# Patient Record
Sex: MALE | Race: WHITE | NOT HISPANIC OR LATINO | URBAN - METROPOLITAN AREA
[De-identification: names, ages, dates, MRNs, and addresses within clinical notes are randomized per-mention and may not be internally consistent; named-entity substitution may affect disease eponyms.]

---

## 2017-11-10 ENCOUNTER — EMERGENCY (EMERGENCY)
Facility: HOSPITAL | Age: 32
LOS: 0 days | Discharge: HOME | End: 2017-11-10

## 2017-11-10 DIAGNOSIS — F11.20 OPIOID DEPENDENCE, UNCOMPLICATED: ICD-10-CM

## 2017-11-10 DIAGNOSIS — F41.9 ANXIETY DISORDER, UNSPECIFIED: ICD-10-CM

## 2017-11-10 DIAGNOSIS — Z88.2 ALLERGY STATUS TO SULFONAMIDES: ICD-10-CM

## 2017-11-10 DIAGNOSIS — J45.909 UNSPECIFIED ASTHMA, UNCOMPLICATED: ICD-10-CM

## 2017-11-10 DIAGNOSIS — Z88.1 ALLERGY STATUS TO OTHER ANTIBIOTIC AGENTS STATUS: ICD-10-CM

## 2017-11-10 DIAGNOSIS — F17.200 NICOTINE DEPENDENCE, UNSPECIFIED, UNCOMPLICATED: ICD-10-CM

## 2017-11-10 DIAGNOSIS — F11.10 OPIOID ABUSE, UNCOMPLICATED: ICD-10-CM

## 2017-11-11 ENCOUNTER — INPATIENT (INPATIENT)
Facility: HOSPITAL | Age: 32
LOS: 4 days | Discharge: HOME | End: 2017-11-16
Attending: INTERNAL MEDICINE

## 2017-11-11 ENCOUNTER — EMERGENCY (EMERGENCY)
Facility: HOSPITAL | Age: 32
LOS: 0 days | Discharge: HOME | End: 2017-11-11

## 2017-11-11 DIAGNOSIS — F41.9 ANXIETY DISORDER, UNSPECIFIED: ICD-10-CM

## 2017-11-11 DIAGNOSIS — J45.909 UNSPECIFIED ASTHMA, UNCOMPLICATED: ICD-10-CM

## 2017-11-11 DIAGNOSIS — F17.200 NICOTINE DEPENDENCE, UNSPECIFIED, UNCOMPLICATED: ICD-10-CM

## 2017-11-11 DIAGNOSIS — F11.20 OPIOID DEPENDENCE, UNCOMPLICATED: ICD-10-CM

## 2017-11-11 DIAGNOSIS — Z02.9 ENCOUNTER FOR ADMINISTRATIVE EXAMINATIONS, UNSPECIFIED: ICD-10-CM

## 2017-11-20 DIAGNOSIS — F10.10 ALCOHOL ABUSE, UNCOMPLICATED: ICD-10-CM

## 2017-11-20 DIAGNOSIS — F11.20 OPIOID DEPENDENCE, UNCOMPLICATED: ICD-10-CM

## 2017-11-20 DIAGNOSIS — F17.210 NICOTINE DEPENDENCE, CIGARETTES, UNCOMPLICATED: ICD-10-CM

## 2018-05-26 ENCOUNTER — INPATIENT (INPATIENT)
Facility: HOSPITAL | Age: 33
LOS: 4 days | Discharge: HOME | End: 2018-05-31
Attending: INTERNAL MEDICINE | Admitting: INTERNAL MEDICINE

## 2018-05-26 VITALS
WEIGHT: 265 LBS | HEIGHT: 72 IN | TEMPERATURE: 99 F | HEART RATE: 79 BPM | DIASTOLIC BLOOD PRESSURE: 67 MMHG | SYSTOLIC BLOOD PRESSURE: 167 MMHG | RESPIRATION RATE: 18 BRPM | OXYGEN SATURATION: 94 %

## 2018-05-26 DIAGNOSIS — F10.10 ALCOHOL ABUSE, UNCOMPLICATED: ICD-10-CM

## 2018-05-26 DIAGNOSIS — F32.9 MAJOR DEPRESSIVE DISORDER, SINGLE EPISODE, UNSPECIFIED: ICD-10-CM

## 2018-05-26 DIAGNOSIS — F11.10 OPIOID ABUSE, UNCOMPLICATED: ICD-10-CM

## 2018-05-26 DIAGNOSIS — K66.8 OTHER SPECIFIED DISORDERS OF PERITONEUM: Chronic | ICD-10-CM

## 2018-05-26 LAB
ALBUMIN SERPL ELPH-MCNC: 4 G/DL — SIGNIFICANT CHANGE UP (ref 3.5–5.2)
ALP SERPL-CCNC: 52 U/L — SIGNIFICANT CHANGE UP (ref 30–115)
ALT FLD-CCNC: 31 U/L — SIGNIFICANT CHANGE UP (ref 0–41)
AMMONIA BLD-MCNC: 49 UMOL/L — SIGNIFICANT CHANGE UP (ref 11–55)
ANION GAP SERPL CALC-SCNC: 10 MMOL/L — SIGNIFICANT CHANGE UP (ref 7–14)
APAP SERPL-MCNC: <5 UG/ML — LOW (ref 10–30)
APPEARANCE UR: CLEAR — SIGNIFICANT CHANGE UP
APTT BLD: 29.9 SEC — SIGNIFICANT CHANGE UP (ref 27–39.2)
AST SERPL-CCNC: 21 U/L — SIGNIFICANT CHANGE UP (ref 0–41)
BASOPHILS # BLD AUTO: 0.03 K/UL — SIGNIFICANT CHANGE UP (ref 0–0.2)
BASOPHILS NFR BLD AUTO: 0.4 % — SIGNIFICANT CHANGE UP (ref 0–1)
BILIRUB SERPL-MCNC: 0.3 MG/DL — SIGNIFICANT CHANGE UP (ref 0.2–1.2)
BILIRUB UR-MCNC: NEGATIVE — SIGNIFICANT CHANGE UP
BUN SERPL-MCNC: 10 MG/DL — SIGNIFICANT CHANGE UP (ref 10–20)
CALCIUM SERPL-MCNC: 9.5 MG/DL — SIGNIFICANT CHANGE UP (ref 8.5–10.1)
CHLORIDE SERPL-SCNC: 103 MMOL/L — SIGNIFICANT CHANGE UP (ref 98–110)
CO2 SERPL-SCNC: 28 MMOL/L — SIGNIFICANT CHANGE UP (ref 17–32)
COLOR SPEC: YELLOW — SIGNIFICANT CHANGE UP
CREAT SERPL-MCNC: 1 MG/DL — SIGNIFICANT CHANGE UP (ref 0.7–1.5)
DIFF PNL FLD: NEGATIVE — SIGNIFICANT CHANGE UP
DRUG SCREEN 1, URINE RESULT: SIGNIFICANT CHANGE UP
EOSINOPHIL # BLD AUTO: 0.22 K/UL — SIGNIFICANT CHANGE UP (ref 0–0.7)
EOSINOPHIL NFR BLD AUTO: 2.9 % — SIGNIFICANT CHANGE UP (ref 0–8)
ETHANOL SERPL-MCNC: <10 MG/DL — HIGH
GLUCOSE SERPL-MCNC: 90 MG/DL — SIGNIFICANT CHANGE UP (ref 70–99)
GLUCOSE UR QL: NEGATIVE MG/DL — SIGNIFICANT CHANGE UP
HCT VFR BLD CALC: 41.9 % — LOW (ref 42–52)
HGB BLD-MCNC: 14.1 G/DL — SIGNIFICANT CHANGE UP (ref 14–18)
IMM GRANULOCYTES NFR BLD AUTO: 0.1 % — SIGNIFICANT CHANGE UP (ref 0.1–0.3)
INR BLD: 1.11 RATIO — SIGNIFICANT CHANGE UP (ref 0.65–1.3)
KETONES UR-MCNC: NEGATIVE — SIGNIFICANT CHANGE UP
LEUKOCYTE ESTERASE UR-ACNC: NEGATIVE — SIGNIFICANT CHANGE UP
LIDOCAIN IGE QN: 20 U/L — SIGNIFICANT CHANGE UP (ref 7–60)
LYMPHOCYTES # BLD AUTO: 3.47 K/UL — HIGH (ref 1.2–3.4)
LYMPHOCYTES # BLD AUTO: 45.9 % — SIGNIFICANT CHANGE UP (ref 20.5–51.1)
MAGNESIUM SERPL-MCNC: 1.9 MG/DL — SIGNIFICANT CHANGE UP (ref 1.8–2.4)
MCHC RBC-ENTMCNC: 28.4 PG — SIGNIFICANT CHANGE UP (ref 27–31)
MCHC RBC-ENTMCNC: 33.7 G/DL — SIGNIFICANT CHANGE UP (ref 32–37)
MCV RBC AUTO: 84.3 FL — SIGNIFICANT CHANGE UP (ref 80–94)
MONOCYTES # BLD AUTO: 0.51 K/UL — SIGNIFICANT CHANGE UP (ref 0.1–0.6)
MONOCYTES NFR BLD AUTO: 6.7 % — SIGNIFICANT CHANGE UP (ref 1.7–9.3)
NEUTROPHILS # BLD AUTO: 3.32 K/UL — SIGNIFICANT CHANGE UP (ref 1.4–6.5)
NEUTROPHILS NFR BLD AUTO: 44 % — SIGNIFICANT CHANGE UP (ref 42.2–75.2)
NITRITE UR-MCNC: NEGATIVE — SIGNIFICANT CHANGE UP
NRBC # BLD: 0 /100 WBCS — SIGNIFICANT CHANGE UP (ref 0–0)
PH UR: 5.5 — SIGNIFICANT CHANGE UP (ref 5–8)
PLATELET # BLD AUTO: 236 K/UL — SIGNIFICANT CHANGE UP (ref 130–400)
POTASSIUM SERPL-MCNC: 3.8 MMOL/L — SIGNIFICANT CHANGE UP (ref 3.5–5)
POTASSIUM SERPL-SCNC: 3.8 MMOL/L — SIGNIFICANT CHANGE UP (ref 3.5–5)
PROT SERPL-MCNC: 6.3 G/DL — SIGNIFICANT CHANGE UP (ref 6–8)
PROT UR-MCNC: NEGATIVE MG/DL — SIGNIFICANT CHANGE UP
PROTHROM AB SERPL-ACNC: 12 SEC — SIGNIFICANT CHANGE UP (ref 9.95–12.87)
RBC # BLD: 4.97 M/UL — SIGNIFICANT CHANGE UP (ref 4.7–6.1)
RBC # FLD: 13.4 % — SIGNIFICANT CHANGE UP (ref 11.5–14.5)
SALICYLATES SERPL-MCNC: <0.3 MG/DL — LOW (ref 4–30)
SODIUM SERPL-SCNC: 141 MMOL/L — SIGNIFICANT CHANGE UP (ref 135–146)
SP GR SPEC: >=1.03 (ref 1.01–1.03)
UROBILINOGEN FLD QL: 0.2 MG/DL — SIGNIFICANT CHANGE UP (ref 0.2–0.2)
WBC # BLD: 7.56 K/UL — SIGNIFICANT CHANGE UP (ref 4.8–10.8)
WBC # FLD AUTO: 7.56 K/UL — SIGNIFICANT CHANGE UP (ref 4.8–10.8)

## 2018-05-26 RX ORDER — IBUPROFEN 200 MG
400 TABLET ORAL EVERY 12 HOURS
Qty: 0 | Refills: 0 | Status: DISCONTINUED | OUTPATIENT
Start: 2018-05-26 | End: 2018-05-31

## 2018-05-26 RX ORDER — THIAMINE MONONITRATE (VIT B1) 100 MG
100 TABLET ORAL DAILY
Qty: 0 | Refills: 0 | Status: DISCONTINUED | OUTPATIENT
Start: 2018-05-26 | End: 2018-05-31

## 2018-05-26 RX ORDER — METHADONE HYDROCHLORIDE 40 MG/1
5 TABLET ORAL EVERY 12 HOURS
Qty: 0 | Refills: 0 | Status: DISCONTINUED | OUTPATIENT
Start: 2018-05-29 | End: 2018-05-31

## 2018-05-26 RX ORDER — METHADONE HYDROCHLORIDE 40 MG/1
15 TABLET ORAL ONCE
Qty: 0 | Refills: 0 | Status: DISCONTINUED | OUTPATIENT
Start: 2018-05-26 | End: 2018-05-26

## 2018-05-26 RX ORDER — HYDROXYZINE HCL 10 MG
50 TABLET ORAL EVERY 6 HOURS
Qty: 0 | Refills: 0 | Status: DISCONTINUED | OUTPATIENT
Start: 2018-05-26 | End: 2018-05-31

## 2018-05-26 RX ORDER — TRAZODONE HCL 50 MG
100 TABLET ORAL AT BEDTIME
Qty: 0 | Refills: 0 | Status: DISCONTINUED | OUTPATIENT
Start: 2018-05-26 | End: 2018-05-31

## 2018-05-26 RX ORDER — METHADONE HYDROCHLORIDE 40 MG/1
10 TABLET ORAL EVERY 12 HOURS
Qty: 0 | Refills: 0 | Status: DISCONTINUED | OUTPATIENT
Start: 2018-05-28 | End: 2018-05-29

## 2018-05-26 RX ORDER — TUBERCULIN PURIFIED PROTEIN DERIVATIVE 5 [IU]/.1ML
5 INJECTION, SOLUTION INTRADERMAL ONCE
Qty: 0 | Refills: 0 | Status: COMPLETED | OUTPATIENT
Start: 2018-05-26 | End: 2018-05-27

## 2018-05-26 RX ORDER — METHADONE HYDROCHLORIDE 40 MG/1
TABLET ORAL
Qty: 0 | Refills: 0 | Status: COMPLETED | OUTPATIENT
Start: 2018-05-27 | End: 2018-05-31

## 2018-05-26 RX ORDER — METHADONE HYDROCHLORIDE 40 MG/1
15 TABLET ORAL EVERY 12 HOURS
Qty: 0 | Refills: 0 | Status: DISCONTINUED | OUTPATIENT
Start: 2018-05-27 | End: 2018-05-27

## 2018-05-26 RX ORDER — ACETAMINOPHEN 500 MG
650 TABLET ORAL EVERY 8 HOURS
Qty: 0 | Refills: 0 | Status: DISCONTINUED | OUTPATIENT
Start: 2018-05-26 | End: 2018-05-31

## 2018-05-26 RX ADMIN — Medication 50 MILLIGRAM(S): at 18:32

## 2018-05-26 RX ADMIN — METHADONE HYDROCHLORIDE 15 MILLIGRAM(S): 40 TABLET ORAL at 21:19

## 2018-05-26 RX ADMIN — Medication 100 MILLIGRAM(S): at 21:20

## 2018-05-26 NOTE — H&P ADULT - NSHPPHYSICALEXAM_GEN_ALL_CORE
PHYSICAL EXAM:    Vital Signs Last 24 Hrs    T(F): 96.4 (05-26-18 @ 16:56), Max: 98.7 (05-26-18 @ 13:41)  HR: 58 (05-26-18 @ 16:56) (58 - 79)  BP: 120/61 (05-26-18 @ 16:56)  RR: 16 (05-26-18 @ 16:56) (16 - 19)  SpO2: 97% (05-26-18 @ 14:52) (94% - 97%)    Constitutional: NAD, A&O x3    Eyes: PERRLA    Respiratory: +air entry, no rales, no rhonchi, no wheezes    Cardiovascular: +S1 and S2, regular rate and rhythm    Gastrointestinal: +BS, soft, non-tender, not distended    Extremities:  + Hand Tremors, no edema, no calf tenderness    Vascular: +dorsal pedis and radial pulses, no extremity cyanosis    Neurological: sensation intact, ROM equal B/L, CN II-XII intact    Skin: Multiple tattoos, no rashes, normal turgor

## 2018-05-26 NOTE — H&P ADULT - ATTENDING COMMENTS
Patient interviewed and examined.    Chart reviewed.    PA's H&P noted and modified, as appropriate.    Case discussed on team rounds    Following is my summary of the case.    Admitted for detox: from _x___ED, ___Intake, ____Med/Surg Floor    Alcohol__x__   Opioid__x___  Benzo___ Other_____    Substance amount, duration of use, last usage, and prior attempts at detox or rehabs, are outlined above in the H&P and discussed with patient.    Associated withdrawal symptoms presents.  Comorbid conditions noted. Chronic and Stable.    Past Medical Hx, Psych Hx, family Hx, Social Hx from H&P reviewed and NO changes.    Old medical record and medication Hx. Reviewed    Following items reviewed and addressed:  1. labs  2. EKG  3. Imaging from PACs module    Examination: no change from PA's exam.    Place on following protocol  _____Medically Managed  __X__Medically Supervised    Ciwa__x___Librium taper____Ativan taper___Methadone taper_x__ Phenobarb taper____ Suboxone Induction____MMTP____    Narcan Kit Offered    Psych Consult __X__N/A  ___Ordered    Physical Therapy  ___X N/A   ___  Ordered    Aftercare disposition to be addressed by counselors.    Estimated length of stay 3-5 days.

## 2018-05-26 NOTE — H&P ADULT - PROBLEM SELECTOR PLAN 3
presently on no meds, stopped them himself a while ago. I offered Psych evaluation to restart but he refused.

## 2018-05-26 NOTE — H&P ADULT - ASSESSMENT
31 y/o male Alcohol and Heroin Dependence, also with extensive Psych Hx/non-complient with medications.

## 2018-05-26 NOTE — H&P ADULT - HISTORY OF PRESENT ILLNESS
31 y/o male with Alcohol and Opiate Dependence.  -Heroin/Opiate X 12 yrs, Heroin used daily, nasal route, 3 Bundles /day, last used this AM: 1 Bundle  -Alcohol X 3 yrs, drinks daily, AVG intake: 1 pt liquor, last drink 2 days ago.  + Tremors, + Blackouts, No Withdrawl Seizures.    2-3 Attempts at Detox, last attempt 11/2017

## 2018-05-26 NOTE — ED PROVIDER NOTE - ATTENDING CONTRIBUTION TO CARE
33 yo M presents requesting detox from alcohol.  Also uses heroin.  no SI, no CP, no SOB.  On exam pt in NAD AAO x 3, no signs of trauma, steady gait, Lungs CTA  B/L, no wrr, OP clear, PERRL, no tongue fasciculations, no tremor, abd nt nd, soft

## 2018-05-26 NOTE — H&P ADULT - NSHPLABSRESULTS_GEN_ALL_CORE
14.1   7.56  )-----------( 236      ( 26 May 2018 14:41 )             41.9       05-26    141  |  103  |  10  ----------------------------<  90  3.8   |  28  |  1.0    Ca    9.5      26 May 2018 14:41  Mg     1.9     05-26    TPro  6.3  /  Alb  4.0  /  TBili  0.3  /  DBili  x   /  AST  21  /  ALT  31  /  AlkPhos  52  05-26        PT/INR - ( 26 May 2018 14:41 )   PT: 12.00 sec;   INR: 1.11 ratio         PTT - ( 26 May 2018 14:41 )  PTT:29.9 sec

## 2018-05-26 NOTE — H&P ADULT - PMH
Anxiety    Asthma affecting pregnancy, antepartum    Bipolar 1 disorder    Depression    ETOH abuse    Heroin abuse    Substance abuse

## 2018-05-27 LAB
HAV IGM SER-ACNC: SIGNIFICANT CHANGE UP
HBV CORE IGM SER-ACNC: SIGNIFICANT CHANGE UP
HBV SURFACE AG SER-ACNC: SIGNIFICANT CHANGE UP
HCV AB S/CO SERPL IA: 0.05 S/CO — SIGNIFICANT CHANGE UP
HCV AB SERPL-IMP: SIGNIFICANT CHANGE UP

## 2018-05-27 RX ADMIN — Medication 50 MILLIGRAM(S): at 08:58

## 2018-05-27 RX ADMIN — METHADONE HYDROCHLORIDE 15 MILLIGRAM(S): 40 TABLET ORAL at 08:58

## 2018-05-27 RX ADMIN — METHADONE HYDROCHLORIDE 15 MILLIGRAM(S): 40 TABLET ORAL at 21:31

## 2018-05-27 RX ADMIN — TUBERCULIN PURIFIED PROTEIN DERIVATIVE 5 UNIT(S): 5 INJECTION, SOLUTION INTRADERMAL at 18:55

## 2018-05-27 RX ADMIN — Medication 50 MILLIGRAM(S): at 18:43

## 2018-05-27 RX ADMIN — Medication 100 MILLIGRAM(S): at 08:58

## 2018-05-27 RX ADMIN — Medication 100 MILLIGRAM(S): at 21:41

## 2018-05-27 RX ADMIN — Medication 1 TABLET(S): at 08:58

## 2018-05-27 NOTE — CHART NOTE - NSCHARTNOTEFT_GEN_A_CORE
PPD PLACED ON LFA, TO BE READ IN 48-72 HRS PPD PLACED ON LFA, TO BE READ IN 48-72 HRS    PPD read 5/29/18   0mm induration...................... Thompson RRT

## 2018-05-28 RX ADMIN — Medication 100 MILLIGRAM(S): at 09:10

## 2018-05-28 RX ADMIN — METHADONE HYDROCHLORIDE 10 MILLIGRAM(S): 40 TABLET ORAL at 09:11

## 2018-05-28 RX ADMIN — Medication 50 MILLIGRAM(S): at 09:10

## 2018-05-28 RX ADMIN — Medication 100 MILLIGRAM(S): at 21:22

## 2018-05-28 RX ADMIN — Medication 1 TABLET(S): at 09:10

## 2018-05-28 RX ADMIN — METHADONE HYDROCHLORIDE 10 MILLIGRAM(S): 40 TABLET ORAL at 21:19

## 2018-05-28 NOTE — CHART NOTE - NSCHARTNOTEFT_GEN_A_CORE
Subsequent Inpatient Encounter                                       Detox Unit    SUZAN RIVERA   32y   Male      Chief Complaint:    Follow up for polysubstance abuse    HPI:     I reviewed previous notes. No Change, except if noted below.             Detail:_    ROS:   I reviewed with patient.  No changes from previous notes except if noted below.             Detail: _    PFSH I reviewed with patient. No changes from previous notes except if noted below.             Detail_    Medication reconciliation performed.    MEDICATIONS  (STANDING):  methadone    Tablet 10 milliGRAM(s) Oral every 12 hours  multivitamin 1 Tablet(s) Oral daily  thiamine 100 milliGRAM(s) Oral daily      MEDICATIONS  (PRN):  acetaminophen   Tablet. 650 milliGRAM(s) Oral every 8 hours PRN Mild Pain (1 - 3)  chlordiazePOXIDE 50 milliGRAM(s) Oral every 1 hour PRN Alcohol Withdrawal Symptoms  chlordiazePOXIDE 25 milliGRAM(s) Oral every 2 hours PRN Alcohol Withdrawal Symptoms  hydrOXYzine hydrochloride 50 milliGRAM(s) Oral every 6 hours PRN Anxiety  ibuprofen  Tablet 400 milliGRAM(s) Oral every 12 hours PRN Mild pain  traZODone 100 milliGRAM(s) Oral at bedtime PRN INSOMNIA      T(C): 36.4 (05-28-18 @ 06:33), Max: 36.5 (05-27-18 @ 18:00)  HR: 67 (05-28-18 @ 06:33) (47 - 71)  BP: 104/63 (05-28-18 @ 06:33) (95/48 - 130/56)  RR: 18 (05-28-18 @ 06:33) (14 - 18)  SpO2: --    PHYSICAL EXAM:      Constitutional: NAD, A&O x3    Eyes: PERRLA, no conjuctivitis    Neck: no lymphadenopathy    Respiratory: +air entry, no rales, no rhonchi, no wheezes    Cardiovascular: +S1 and S2, regular rate and rhythm    Gastrointestinal: +BS, soft, non-tender, not distended    Extremities:  no edema, no calf tenderness    Skin: no rashes, normal turgor                            14.1   7.56  )-----------( 236      ( 26 May 2018 14:41 )             41.9   05-26    141  |  103  |  10  ----------------------------<  90  3.8   |  28  |  1.0    Ca    9.5      26 May 2018 14:41  Mg     1.9     05-26    TPro  6.3  /  Alb  4.0  /  TBili  0.3  /  DBili  x   /  AST  21  /  ALT  31  /  AlkPhos  52  05-26  PT/INR - ( 26 May 2018 14:41 )   PT: 12.00 sec;   INR: 1.11 ratio         PTT - ( 26 May 2018 14:41 )  PTT:29.9 sec  Magnesium, Serum: 1.9 mg/dL (05-26-18 @ 14:41)  Ammonia, Serum: 49 umol/L (05-26-18 @ 14:41)  Hepatitis B Surface Antigen: Nonreact (05-26-18 @ 14:41)  Hepatitis C Virus S/CO Ratio: 0.05 S/CO (05-26-18 @ 14:41)    Hepatitis C Virus Interpretation: Nonreact (05-26-18 @ 14:41)      Urinalysis Basic - ( 26 May 2018 18:05 )    Color: Yellow / Appearance: Clear / SG: >=1.030 / pH: x  Gluc: x / Ketone: Negative  / Bili: Negative / Urobili: 0.2 mg/dL   Blood: x / Protein: Negative mg/dL / Nitrite: Negative   Leuk Esterase: Negative / RBC: x / WBC x   Sq Epi: x / Non Sq Epi: x / Bacteria: x    Drug Screen 1, Urine Result: Done (05-26-18 @ 18:05)        Impression and Plan:    Primary Diagnosis:  Alcohol Dependency                                Medication: Librium Protocol/ CIWA Protocol    Secondary Diagnosis:   opiate dependence                             Medication: methadone taper    Tertiary Diagnosis:                                                                       Medication      Continue Detox Protocols. Use of PRNS as needed for withdrawal and comfort.    Adjustments to protocols:    Labs/ Tests reviewed.    Tests ordered:     Likely Disposition: ___Home       ___Rehab       ___Outpatient Program    ___Self Help     _____Other    Estimated Length of stay:____

## 2018-05-29 RX ADMIN — Medication 0.1 MILLIGRAM(S): at 12:49

## 2018-05-29 RX ADMIN — Medication 100 MILLIGRAM(S): at 09:15

## 2018-05-29 RX ADMIN — Medication 50 MILLIGRAM(S): at 09:15

## 2018-05-29 RX ADMIN — TUBERCULIN PURIFIED PROTEIN DERIVATIVE 5 UNIT(S): 5 INJECTION, SOLUTION INTRADERMAL at 14:33

## 2018-05-29 RX ADMIN — METHADONE HYDROCHLORIDE 10 MILLIGRAM(S): 40 TABLET ORAL at 09:15

## 2018-05-29 RX ADMIN — Medication 100 MILLIGRAM(S): at 22:47

## 2018-05-29 RX ADMIN — Medication 1 TABLET(S): at 09:15

## 2018-05-29 RX ADMIN — Medication 50 MILLIGRAM(S): at 22:48

## 2018-05-29 RX ADMIN — METHADONE HYDROCHLORIDE 5 MILLIGRAM(S): 40 TABLET ORAL at 21:09

## 2018-05-30 RX ORDER — LAMOTRIGINE 25 MG/1
0 TABLET, ORALLY DISINTEGRATING ORAL
Qty: 0 | Refills: 0 | COMMUNITY

## 2018-05-30 RX ADMIN — Medication 100 MILLIGRAM(S): at 09:12

## 2018-05-30 RX ADMIN — Medication 50 MILLIGRAM(S): at 21:02

## 2018-05-30 RX ADMIN — Medication 100 MILLIGRAM(S): at 21:04

## 2018-05-30 RX ADMIN — Medication 50 MILLIGRAM(S): at 09:16

## 2018-05-30 RX ADMIN — Medication 0.1 MILLIGRAM(S): at 11:53

## 2018-05-30 RX ADMIN — METHADONE HYDROCHLORIDE 5 MILLIGRAM(S): 40 TABLET ORAL at 21:02

## 2018-05-30 RX ADMIN — Medication 1 TABLET(S): at 09:12

## 2018-05-30 RX ADMIN — METHADONE HYDROCHLORIDE 5 MILLIGRAM(S): 40 TABLET ORAL at 09:16

## 2018-05-30 NOTE — CHART NOTE - NSCHARTNOTEFT_GEN_A_CORE
Subsequent Inpatient Encounter                                       Detox Unit    SUZAN RIVERA   32y   Male      Chief Complaint:    Follow up for polysubstance abuse    HPI:     I reviewed previous notes. No Change, except if noted below.             Detail:_    ROS:   I reviewed with patient.  No changes from previous notes except if noted below.             Detail: _    PFSH I reviewed with patient. No changes from previous notes except if noted below.             Detail_    Medication reconciliation performed.    MEDICATIONS  (STANDING):  methadone    Tablet 10 milliGRAM(s) Oral every 12 hours  multivitamin 1 Tablet(s) Oral daily  thiamine 100 milliGRAM(s) Oral daily      MEDICATIONS  (PRN):  acetaminophen   Tablet. 650 milliGRAM(s) Oral every 8 hours PRN Mild Pain (1 - 3)  chlordiazePOXIDE 50 milliGRAM(s) Oral every 1 hour PRN Alcohol Withdrawal Symptoms  chlordiazePOXIDE 25 milliGRAM(s) Oral every 2 hours PRN Alcohol Withdrawal Symptoms  hydrOXYzine hydrochloride 50 milliGRAM(s) Oral every 6 hours PRN Anxiety  ibuprofen  Tablet 400 milliGRAM(s) Oral every 12 hours PRN Mild pain  traZODone 100 milliGRAM(s) Oral at bedtime PRN INSOMNIA      T(C): 36.4 (05-28-18 @ 06:33), Max: 36.5 (05-27-18 @ 18:00)  HR: 67 (05-28-18 @ 06:33) (47 - 71)  BP: 104/63 (05-28-18 @ 06:33) (95/48 - 130/56)  RR: 18 (05-28-18 @ 06:33) (14 - 18)  SpO2: --    PHYSICAL EXAM:      Constitutional: NAD, A&O x3    Eyes: PERRLA, no conjuctivitis    Neck: no lymphadenopathy    Respiratory: +air entry, no rales, no rhonchi, no wheezes    Cardiovascular: +S1 and S2, regular rate and rhythm    Gastrointestinal: +BS, soft, non-tender, not distended    Extremities:  no edema, no calf tenderness    Skin: no rashes, normal turgor                            14.1   7.56  )-----------( 236      ( 26 May 2018 14:41 )             41.9   05-26    141  |  103  |  10  ----------------------------<  90  3.8   |  28  |  1.0    Ca    9.5      26 May 2018 14:41  Mg     1.9     05-26    TPro  6.3  /  Alb  4.0  /  TBili  0.3  /  DBili  x   /  AST  21  /  ALT  31  /  AlkPhos  52  05-26  PT/INR - ( 26 May 2018 14:41 )   PT: 12.00 sec;   INR: 1.11 ratio         PTT - ( 26 May 2018 14:41 )  PTT:29.9 sec  Magnesium, Serum: 1.9 mg/dL (05-26-18 @ 14:41)  Ammonia, Serum: 49 umol/L (05-26-18 @ 14:41)  Hepatitis B Surface Antigen: Nonreact (05-26-18 @ 14:41)  Hepatitis C Virus S/CO Ratio: 0.05 S/CO (05-26-18 @ 14:41)    Hepatitis C Virus Interpretation: Nonreact (05-26-18 @ 14:41)      Urinalysis Basic - ( 26 May 2018 18:05 )    Color: Yellow / Appearance: Clear / SG: >=1.030 / pH: x  Gluc: x / Ketone: Negative  / Bili: Negative / Urobili: 0.2 mg/dL   Blood: x / Protein: Negative mg/dL / Nitrite: Negative   Leuk Esterase: Negative / RBC: x / WBC x   Sq Epi: x / Non Sq Epi: x / Bacteria: x    Drug Screen 1, Urine Result: Done (05-26-18 @ 18:05)        Impression and Plan:    Primary Diagnosis:  Alcohol Dependency                                Medication: Librium CIWA Protocol    Secondary Diagnosis:   opiate dependence                             Medication: methadone taper    Tertiary Diagnosis:                                                                       Medication      Continue Detox Protocols. Use of PRNS as needed for withdrawal and comfort.    Adjustments to protocols:    Labs/ Tests reviewed.    Tests ordered:     Likely Disposition: _x__Home       ___Rehab       ___Outpatient Program    ___Self Help     _____Other    Estimated Length of stay:__5__

## 2018-05-30 NOTE — CHART NOTE - NSCHARTNOTEFT_GEN_A_CORE
Allergies:  Sulfacetamide Sodium (Hives)      Diet: Regular    Activity: as tolerated    Follow up with    1. PMD in 2 weeks    2. Psych in 2 weeks    3.    Follow up for abnormal labs/tests    1.    Extra Instructions:      Flu Vaccine given  Yes_____         No______      Diagnosis:  Chemical Dependency   Maintain sobriety  refrain from all use      Patient Signature___________________________________________  Date_________________      Nurse Signature_____________________________________________Date_________________

## 2018-05-31 VITALS
HEART RATE: 53 BPM | TEMPERATURE: 97 F | RESPIRATION RATE: 16 BRPM | DIASTOLIC BLOOD PRESSURE: 54 MMHG | SYSTOLIC BLOOD PRESSURE: 115 MMHG

## 2018-05-31 RX ADMIN — METHADONE HYDROCHLORIDE 5 MILLIGRAM(S): 40 TABLET ORAL at 09:26

## 2018-05-31 RX ADMIN — Medication 100 MILLIGRAM(S): at 09:26

## 2018-05-31 RX ADMIN — Medication 1 TABLET(S): at 09:26

## 2018-06-04 DIAGNOSIS — F11.20 OPIOID DEPENDENCE, UNCOMPLICATED: ICD-10-CM

## 2018-06-04 DIAGNOSIS — F10.20 ALCOHOL DEPENDENCE, UNCOMPLICATED: ICD-10-CM

## 2018-06-04 DIAGNOSIS — F32.9 MAJOR DEPRESSIVE DISORDER, SINGLE EPISODE, UNSPECIFIED: ICD-10-CM

## 2018-06-04 DIAGNOSIS — F17.210 NICOTINE DEPENDENCE, CIGARETTES, UNCOMPLICATED: ICD-10-CM

## 2018-06-20 ENCOUNTER — EMERGENCY (EMERGENCY)
Facility: HOSPITAL | Age: 33
LOS: 0 days | Discharge: HOME | End: 2018-06-20
Attending: EMERGENCY MEDICINE | Admitting: EMERGENCY MEDICINE

## 2018-06-20 VITALS
TEMPERATURE: 98 F | WEIGHT: 255.07 LBS | DIASTOLIC BLOOD PRESSURE: 51 MMHG | OXYGEN SATURATION: 95 % | SYSTOLIC BLOOD PRESSURE: 103 MMHG | HEART RATE: 85 BPM | RESPIRATION RATE: 18 BRPM

## 2018-06-20 DIAGNOSIS — Z88.2 ALLERGY STATUS TO SULFONAMIDES: ICD-10-CM

## 2018-06-20 DIAGNOSIS — Z79.899 OTHER LONG TERM (CURRENT) DRUG THERAPY: ICD-10-CM

## 2018-06-20 DIAGNOSIS — Z98.890 OTHER SPECIFIED POSTPROCEDURAL STATES: ICD-10-CM

## 2018-06-20 DIAGNOSIS — F11.10 OPIOID ABUSE, UNCOMPLICATED: ICD-10-CM

## 2018-06-20 DIAGNOSIS — K66.8 OTHER SPECIFIED DISORDERS OF PERITONEUM: Chronic | ICD-10-CM

## 2018-06-20 RX ORDER — TRAZODONE HCL 50 MG
1 TABLET ORAL
Qty: 0 | Refills: 0 | COMMUNITY

## 2018-06-20 RX ORDER — SERTRALINE 25 MG/1
0 TABLET, FILM COATED ORAL
Qty: 0 | Refills: 0 | COMMUNITY

## 2018-06-20 NOTE — ED PROVIDER NOTE - NS ED ROS FT
Review of Systems:  	•	CONSTITUTIONAL - no fever, no diaphoresis, no chills  	•  	•	RESPIRATORY - no shortness of breath, no cough  	•	CARDIAC - no chest pain, no palpitations  	•  	•	MUSCULOSKELETAL - no joint paint, no swelling, no redness  	•	NEUROLOGIC - no weakness, no headache, no paresthesias, no LOC  	•	PSYCH - no anxiety, non suicidal, non homicidal, no hallucination, no depression

## 2018-06-20 NOTE — ED PROVIDER NOTE - PROGRESS NOTE DETAILS
I personally evaluated the patient. I reviewed the Resident’s or Physician Assistant’s note (as assigned above), and agree with the findings and plan except as documented in my note.  32yM with hx of substance abuse here requesting detox.  Pt uses heroin, states that he was supposed to be admitted to a long term facility tomorrow but he used today and family sent here for eval.  Last used 3 hours ago, pt inhales heroin.  No ETOH.  No SI/HI.  ON EXAM:  Pt is awake and alert, non toxic.  No signs of active withdrawal.  RRR.  CTA b/l.  No external signs of trauma.  PLAN:  DC home with out pt clinic f/u information and advised pt to maintain arrangements with rehab facility.

## 2018-06-20 NOTE — ED PROVIDER NOTE - OBJECTIVE STATEMENT
this is 31 yo male presents to ed for evaluation. patient heroine abuser. patient states he is due to go to outpatient rehab in am. his parents wanted him to stay here til junie

## 2018-06-21 PROBLEM — F19.10 OTHER PSYCHOACTIVE SUBSTANCE ABUSE, UNCOMPLICATED: Chronic | Status: ACTIVE | Noted: 2018-05-26

## 2018-06-21 PROBLEM — F10.10 ALCOHOL ABUSE, UNCOMPLICATED: Chronic | Status: ACTIVE | Noted: 2018-05-26

## 2018-06-21 PROBLEM — F31.9 BIPOLAR DISORDER, UNSPECIFIED: Chronic | Status: ACTIVE | Noted: 2018-05-26

## 2018-06-21 PROBLEM — F41.9 ANXIETY DISORDER, UNSPECIFIED: Chronic | Status: ACTIVE | Noted: 2018-05-26

## 2018-06-21 PROBLEM — F32.9 MAJOR DEPRESSIVE DISORDER, SINGLE EPISODE, UNSPECIFIED: Chronic | Status: ACTIVE | Noted: 2018-05-26

## 2018-06-21 PROBLEM — O99.519: Chronic | Status: ACTIVE | Noted: 2018-05-26

## 2018-06-25 NOTE — CHART NOTE - NSCHARTNOTEFT_GEN_A_CORE
The patient was admitted to the inpt detox unit CDU, for   ETOH__x__ Opioid___  Benzo____Polysubstance _____ Dependency.    Pt was admitted from ED__x__, Intake____, Med/surg Floor_______.    Details are present in the preceding History & Physical section and follow up chart notes.  patient was evaluated on daily detox team  rounds.  Withdrawal symptoms and signs were reviewed on a daily basis, and the protocols were adjusted accordingly.    Labs and imaging results were reviewed and discussed with the patient.    All questions from the patient were addressed.  The patient was seen by the Chemical dependency counselors, and different options for after care were discussed.  The patient attended groups, meetings and 1:1 sessions with the counselors.  Narcane Kit was offered and instructions given prior to discharge.    Psychiatry consultation reviewed______, N/A__x____    Physical therapy evaluation reviewed_____, N/A__x__    Pt was given copies of labs and imaging reports, if applicable.    Prescriptions if needed, were sent through [id number] system to the pharmacy amnd are noted in the discharge instruction sheet.    After care was arranged by counselors and pt was discharged to:    Home_x__, Outpt. Program___, Rehab ___, Long term____ [facility] ____ [facility]____ SNF____, AMA___, Admin Discharge____    Principal Diagnosis: Alcohol Dependency_x___ Opioid Dependency___ Benzo Dependency____ Polysubstance Dependency____
